# Patient Record
Sex: MALE | Race: AMERICAN INDIAN OR ALASKA NATIVE
[De-identification: names, ages, dates, MRNs, and addresses within clinical notes are randomized per-mention and may not be internally consistent; named-entity substitution may affect disease eponyms.]

---

## 2018-04-17 ENCOUNTER — HOSPITAL ENCOUNTER (EMERGENCY)
Dept: HOSPITAL 5 - ED | Age: 83
Discharge: HOME | End: 2018-04-17
Payer: MEDICARE

## 2018-04-17 VITALS — SYSTOLIC BLOOD PRESSURE: 191 MMHG | DIASTOLIC BLOOD PRESSURE: 94 MMHG

## 2018-04-17 DIAGNOSIS — E87.5: ICD-10-CM

## 2018-04-17 DIAGNOSIS — I10: Primary | ICD-10-CM

## 2018-04-17 LAB
BASOPHILS # (AUTO): 0.1 K/MM3 (ref 0–0.1)
BASOPHILS NFR BLD AUTO: 1.5 % (ref 0–1.8)
BUN SERPL-MCNC: 23 MG/DL (ref 9–20)
BUN/CREAT SERPL: 15 %
CALCIUM SERPL-MCNC: 8.9 MG/DL (ref 8.4–10.2)
EOSINOPHIL # BLD AUTO: 0.2 K/MM3 (ref 0–0.4)
EOSINOPHIL NFR BLD AUTO: 4 % (ref 0–4.3)
HCT VFR BLD CALC: 38.4 % (ref 35.5–45.6)
HEMOLYSIS INDEX: 165
HGB BLD-MCNC: 12.2 GM/DL (ref 11.8–15.2)
LYMPHOCYTES # BLD AUTO: 1.6 K/MM3 (ref 1.2–5.4)
LYMPHOCYTES NFR BLD AUTO: 31.9 % (ref 13.4–35)
MCH RBC QN AUTO: 27 PG (ref 28–32)
MCHC RBC AUTO-ENTMCNC: 32 % (ref 32–34)
MCV RBC AUTO: 85 FL (ref 84–94)
MONOCYTES # (AUTO): 0.6 K/MM3 (ref 0–0.8)
MONOCYTES % (AUTO): 11 % (ref 0–7.3)
PLATELET # BLD: 228 K/MM3 (ref 140–440)
RBC # BLD AUTO: 4.49 M/MM3 (ref 3.65–5.03)

## 2018-04-17 PROCEDURE — 96360 HYDRATION IV INFUSION INIT: CPT

## 2018-04-17 PROCEDURE — 99283 EMERGENCY DEPT VISIT LOW MDM: CPT

## 2018-04-17 PROCEDURE — 84484 ASSAY OF TROPONIN QUANT: CPT

## 2018-04-17 PROCEDURE — 36415 COLL VENOUS BLD VENIPUNCTURE: CPT

## 2018-04-17 PROCEDURE — 80048 BASIC METABOLIC PNL TOTAL CA: CPT

## 2018-04-17 PROCEDURE — 85025 COMPLETE CBC W/AUTO DIFF WBC: CPT

## 2018-04-17 PROCEDURE — 93010 ELECTROCARDIOGRAM REPORT: CPT

## 2018-04-17 PROCEDURE — 93005 ELECTROCARDIOGRAM TRACING: CPT

## 2018-04-17 NOTE — EMERGENCY DEPARTMENT REPORT
- General


Chief complaint: Weakness


Stated complaint: HEADACHE


Time Seen by Provider: 04/17/18 13:26


Source: patient, EMS


Mode of arrival: Stretcher


Limitations: Other





- History of Present Illness


Initial comments: 


 Patient woke up this morning feeling like his normal self.  While at the 

center with friends started to look like he might pass out.  So, his friends 

called 911 and he was brought to the ER.  At time of presentation to the ER, 

patient feels that his normal self.  He has no complaints.  He is unsure of 

when he last had his blood pressure medication.  Denies palpitations, dizziness

, chest pain, shortness of breath, belly pain, numbness, weakness.





MD Complaint: generalized weakness


Severity scale (0 -10): 0





- Related Data


 Allergies











Allergy/AdvReac Type Severity Reaction Status Date / Time


 


No Known Allergies Allergy   Unverified 04/17/18 12:57














ED Review of Systems


ROS: 


Stated complaint: HEADACHE


Other details as noted in HPI





Comment: All other systems reviewed and negative


Neurological: weakness





ED Past Medical Hx





- Past Medical History


Hx Hypertension: Yes





- Surgical History


Hx Pacemaker: Yes


Hx Internal Defibrillator: Yes





- Social History


Smoking Status: Never Smoker


Substance Use Type: None





ED Physical Exam





- General


Limitations: Other


General appearance: alert, in no apparent distress





- Head


Head exam: Present: atraumatic, normocephalic





- Eye


Eye exam: Present: normal appearance





- ENT


ENT exam: Present: mucous membranes moist





- Neck


Neck exam: Present: normal inspection





- Respiratory


Respiratory exam: Present: normal lung sounds bilaterally.  Absent: respiratory 

distress





- Cardiovascular


Cardiovascular Exam: Present: regular rate, normal rhythm.  Absent: systolic 

murmur, diastolic murmur, rubs, gallop





- GI/Abdominal


GI/Abdominal exam: Present: soft.  Absent: tenderness





- Rectal


Rectal exam: Present: deferred





- Extremities Exam


Extremities exam: Present: normal inspection (gross extremity strength 5/5)





- Back Exam


Back exam: Present: normal inspection





- Neurological Exam


Neurological exam: Present: alert, oriented X3





- Psychiatric


Psychiatric exam: Present: normal affect, normal mood





- Skin


Skin exam: Present: warm, dry, intact, normal color.  Absent: rash





ED Course


 Vital Signs











  04/17/18 04/17/18 04/17/18





  12:57 13:01 13:15


 


Temperature 98.8 F  


 


Pulse Rate 63  61


 


Respiratory 18  19





Rate   


 


Blood Pressure 196/88  197/91


 


Blood Pressure   





[Right]   


 


O2 Sat by Pulse 100 97 100





Oximetry   














  04/17/18 04/17/18 04/17/18





  13:19 13:20 13:22


 


Temperature 98.4 F  


 


Pulse Rate 82  82


 


Respiratory 21 21 





Rate   


 


Blood Pressure   


 


Blood Pressure 144/71  





[Right]   


 


O2 Sat by Pulse 96 98 





Oximetry   














ED Medical Decision Making





- Lab Data


Result diagrams: 


 04/17/18 13:46





 04/17/18 13:46





- EKG Data


-: EKG Interpreted by Me


EKG shows normal: sinus rhythm, axis, QRS complexes, ST-T waves


Rate: bradycardia





- EKG Data


Interpretation: other (1st degree heart block)





- Medical Decision Making


 85-year-old male with history of hypertension presents to the ER with 

transient weakness.  Symptoms are resolved prior to ER arrival.  From talking 

with the patient and his wife become very vague and nonspecific.  Low suspicion 

for CVA event.  Lab work shows concerns for dehydration.  Patient was given IV 

fluids in the ER.  EKG is nonischemic.  Patient is hypertensive in the ER, but 

a somatic.  He is unsure what medication he takes at home.  His wife does not 

know either.  Patient has never been to this ER before.  Since he is 

asymptomatic, patient does not need emergent treatment of his blood pressure.  I

've instructed him to take his blood pressure medication once he gets home.  Of 

note, potassium is also 6.  Per the wife, he does take potassium supplements at 

home.  He is showing no EKG changes of hyperkalemia.  I have told him to hold 

his potassium supplements for the next 3 days and to return to his clinic or 

the ER for recheck of his potassium at that time.  His wife is in agreement 

with this plan.  Cleared for discharge.








- Differential Diagnosis


CVA, left leg abnormality, arrhythmia, dehydration, sepsis


Critical care attestation.: 


If time is entered above; I have spent that time in minutes in the direct care 

of this critically ill patient, excluding procedure time.








ED Disposition


Clinical Impression: 


 Weakness, Hypertension, Hyperkalemia





Disposition: DC-01 TO HOME OR SELFCARE


Is pt being admited?: No


Does the pt Need Aspirin: No


Condition: Stable


Instructions:  Hypertension (ED), Hyperkalemia (ED)


Additional Instructions: 


Your potassium today was 6. Stop taking your potassium supplements. Take your 

blood pressure medications as soon as you get home. Have your potassium level re

-checked in 3 days.